# Patient Record
Sex: MALE | Race: OTHER | HISPANIC OR LATINO
[De-identification: names, ages, dates, MRNs, and addresses within clinical notes are randomized per-mention and may not be internally consistent; named-entity substitution may affect disease eponyms.]

---

## 2023-02-01 PROBLEM — Z00.00 ENCOUNTER FOR PREVENTIVE HEALTH EXAMINATION: Status: ACTIVE | Noted: 2023-02-01

## 2023-02-06 ENCOUNTER — APPOINTMENT (OUTPATIENT)
Dept: PULMONOLOGY | Facility: CLINIC | Age: 43
End: 2023-02-06

## 2023-02-27 ENCOUNTER — NON-APPOINTMENT (OUTPATIENT)
Age: 43
End: 2023-02-27

## 2023-03-02 ENCOUNTER — NON-APPOINTMENT (OUTPATIENT)
Age: 43
End: 2023-03-02

## 2023-03-03 ENCOUNTER — APPOINTMENT (OUTPATIENT)
Dept: PULMONOLOGY | Facility: CLINIC | Age: 43
End: 2023-03-03
Payer: COMMERCIAL

## 2023-03-03 VITALS
BODY MASS INDEX: 35.2 KG/M2 | WEIGHT: 219 LBS | HEIGHT: 66 IN | RESPIRATION RATE: 15 BRPM | DIASTOLIC BLOOD PRESSURE: 75 MMHG | SYSTOLIC BLOOD PRESSURE: 113 MMHG | HEART RATE: 65 BPM | OXYGEN SATURATION: 96 % | TEMPERATURE: 98.3 F

## 2023-03-03 DIAGNOSIS — Z82.49 FAMILY HISTORY OF ISCHEMIC HEART DISEASE AND OTHER DISEASES OF THE CIRCULATORY SYSTEM: ICD-10-CM

## 2023-03-03 DIAGNOSIS — Z80.9 FAMILY HISTORY OF MALIGNANT NEOPLASM, UNSPECIFIED: ICD-10-CM

## 2023-03-03 DIAGNOSIS — R05.9 COUGH, UNSPECIFIED: ICD-10-CM

## 2023-03-03 LAB — POCT - HEMOGLOBIN (HGB), QUANTITATIVE, TRANSCUTANEOUS: 14.4

## 2023-03-03 PROCEDURE — 94726 PLETHYSMOGRAPHY LUNG VOLUMES: CPT

## 2023-03-03 PROCEDURE — 71046 X-RAY EXAM CHEST 2 VIEWS: CPT

## 2023-03-03 PROCEDURE — 94010 BREATHING CAPACITY TEST: CPT

## 2023-03-03 PROCEDURE — 95012 NITRIC OXIDE EXP GAS DETER: CPT

## 2023-03-03 PROCEDURE — 94729 DIFFUSING CAPACITY: CPT

## 2023-03-03 PROCEDURE — 99203 OFFICE O/P NEW LOW 30 MIN: CPT | Mod: 25

## 2023-03-03 PROCEDURE — 88738 HGB QUANT TRANSCUTANEOUS: CPT

## 2023-03-03 PROCEDURE — ZZZZZ: CPT

## 2023-03-03 RX ORDER — ALBUTEROL SULFATE 90 UG/1
108 (90 BASE) INHALANT RESPIRATORY (INHALATION)
Refills: 0 | Status: ACTIVE | COMMUNITY

## 2023-03-03 RX ORDER — RABEPRAZOLE SODIUM 20 MG/1
20 TABLET, DELAYED RELEASE ORAL
Refills: 0 | Status: ACTIVE | COMMUNITY

## 2023-03-03 RX ORDER — SERTRALINE 25 MG/1
TABLET, FILM COATED ORAL
Refills: 0 | Status: ACTIVE | COMMUNITY

## 2023-03-03 NOTE — PHYSICAL EXAM
[No Acute Distress] : no acute distress [Normal Appearance] : normal appearance [No Neck Mass] : no neck mass [Normal Rate/Rhythm] : normal rate/rhythm [Normal S1, S2] : normal s1, s2 [No Murmurs] : no murmurs [No Resp Distress] : no resp distress [Clear to Auscultation Bilaterally] : clear to auscultation bilaterally [No Abnormalities] : no abnormalities [Benign] : benign [Normal Gait] : normal gait [No Clubbing] : no clubbing [No Cyanosis] : no cyanosis [No Edema] : no edema [FROM] : FROM [Normal Color/ Pigmentation] : normal color/ pigmentation [No Focal Deficits] : no focal deficits [Oriented x3] : oriented x3 [Normal Affect] : normal affect [III] : Mallampati Class: III [3+] : Right Tonsil: 3+

## 2023-03-04 PROBLEM — R05.9 COUGH: Status: ACTIVE | Noted: 2023-03-03

## 2023-03-04 NOTE — ASSESSMENT
[FreeTextEntry1] : Cough secondary to asthma.  Appears to be getting better slowly in that regard and does not appear to need treatment other than albuterol.  Should have sleep evaluation home sleep study.  We talked about how many patients have appeared to develop worsening sleep issues post-COVID with this is related to a COVID issue or weight gain is still unclear.\par \par Based on history and physical the patient has a high likelihood of having obstructive sleep apnea. Further assessment by sleep testing is recommended. There is no contraindication to a home sleep study. We will therefore proceed to two night home apnea sleep study for further assessment.\par

## 2023-03-04 NOTE — HISTORY OF PRESENT ILLNESS
[Never] : never [Obstructive Sleep Apnea] : obstructive sleep apnea [Awakes Unrefreshed] : awakes unrefreshed [Awakes with Dry Mouth] : awakes with dry mouth [Snoring] : snoring [TextBox_4] : Patient here for evaluation of asthma and COVID.  Remote history of mild asthma before COVID.  In the summer 2022 he had COVID he was managed at home and took no specific medications.  Since that time he has developed shortness of breath and is using an asthma inhaler more frequently.  He seems to be getting better slowly over time he uses his albuterol for exercise and once in a while as needed he has not needed to take other medications he is not losing weight and his appetite is good\par He is also requesting evaluation for sleep apnea.\par \par \par \par \par  [Awakes with Headache] : does not awaken with headache [Unusual Sleep Behavior] : no unusual sleep behavior [Witnessed Apneas] : no witnessed apneas

## 2023-03-14 ENCOUNTER — FORM ENCOUNTER (OUTPATIENT)
Age: 43
End: 2023-03-14

## 2023-03-15 ENCOUNTER — APPOINTMENT (OUTPATIENT)
Dept: PULMONOLOGY | Facility: CLINIC | Age: 43
End: 2023-03-15
Payer: COMMERCIAL

## 2023-03-15 PROCEDURE — 95800 SLP STDY UNATTENDED: CPT

## 2023-03-16 PROCEDURE — 95800 SLP STDY UNATTENDED: CPT

## 2023-03-31 ENCOUNTER — APPOINTMENT (OUTPATIENT)
Dept: PULMONOLOGY | Facility: CLINIC | Age: 43
End: 2023-03-31
Payer: COMMERCIAL

## 2023-03-31 PROCEDURE — 99212 OFFICE O/P EST SF 10 MIN: CPT | Mod: 95

## 2023-03-31 NOTE — REASON FOR VISIT
[Home] : at home, [unfilled] , at the time of the visit. [Medical Office: ([facility])___] : at the medical office located in  [Patient] : the patient

## 2023-03-31 NOTE — HISTORY OF PRESENT ILLNESS
[TextBox_4] : Post COVID, daytime sleepiness.  Cough.  Overall improving but still reports daytime sleepiness.  Underwent home sleep study visit for review

## 2023-03-31 NOTE — ASSESSMENT
[FreeTextEntry1] : Patient still concerned about sleep apnea issues would like to pursue further recommend laboratory polysomnography in light of negative home sleep study ongoing symptomatology of nonrestorative sleep.

## 2023-06-16 ENCOUNTER — APPOINTMENT (OUTPATIENT)
Dept: PULMONOLOGY | Facility: CLINIC | Age: 43
End: 2023-06-16
Payer: COMMERCIAL

## 2023-06-16 VITALS
TEMPERATURE: 97.9 F | OXYGEN SATURATION: 98 % | HEART RATE: 75 BPM | DIASTOLIC BLOOD PRESSURE: 76 MMHG | WEIGHT: 214 LBS | SYSTOLIC BLOOD PRESSURE: 121 MMHG | HEIGHT: 66 IN | BODY MASS INDEX: 34.39 KG/M2

## 2023-06-16 DIAGNOSIS — G47.33 OBSTRUCTIVE SLEEP APNEA (ADULT) (PEDIATRIC): ICD-10-CM

## 2023-06-16 PROCEDURE — 99213 OFFICE O/P EST LOW 20 MIN: CPT

## 2023-06-16 NOTE — ASSESSMENT
[FreeTextEntry1] : Discussed significance of upper airways resistance in the context of sleep apnea.  I explained to the patient that this is a mild form of sleep apnea that can cause symptoms but likely will not affect his long-term prognosis or health.  This could be addressed in multiple ways.  He could make an effort to lose weight.  He could see a dentist for an oral appliance.  He could try CPAP.\par \par Tablets in the diagnosis of upper airways resistance syndrome requires either a sleep study with an esophageal balloon or a clinical response to CPAP.  For this reason we will do a CPAP trial using a loaner device.\par \par Respiratory therapist to contact patient when motor is available to set up on CPAP 5 with what ever mask patient tolerates follow-up 1 month later

## 2023-06-16 NOTE — PROCEDURE
[FreeTextEntry1] : Laboratory polysomnography demonstrates upper airway resistance syndrome with elevated frequency of respiratory effort related arousals

## 2023-06-16 NOTE — HISTORY OF PRESENT ILLNESS
[TextBox_4] : Follow-up for sleep apnea.  History suggestive of CORINNA negative home sleep study went for laboratory test here for evaluation and review.  Continues to complain of nonrestorative sleep ongoing issues with shift work as well

## 2023-06-30 ENCOUNTER — APPOINTMENT (OUTPATIENT)
Dept: PULMONOLOGY | Facility: CLINIC | Age: 43
End: 2023-06-30
Payer: COMMERCIAL

## 2023-06-30 VITALS
OXYGEN SATURATION: 96 % | HEART RATE: 61 BPM | HEIGHT: 66 IN | RESPIRATION RATE: 15 BRPM | BODY MASS INDEX: 34.39 KG/M2 | TEMPERATURE: 98.1 F | WEIGHT: 214 LBS | DIASTOLIC BLOOD PRESSURE: 72 MMHG | SYSTOLIC BLOOD PRESSURE: 115 MMHG

## 2023-06-30 PROCEDURE — 94660 CPAP INITIATION&MGMT: CPT

## 2023-06-30 NOTE — PROCEDURE
[FreeTextEntry1] : Patient given a loaner [employer]Med Airsense 10 AutoSet s/n [id number] D/N 461 with climate line tubing.  Pressure set at 5cmH2O.  Patient fit for a F&P Eson 2 mask size large.  Patient given a full demonstration on how to operate and maintain equipment and supplies.  Patient was able to demonstrate self operation of equipment and supplies.

## 2023-08-04 ENCOUNTER — APPOINTMENT (OUTPATIENT)
Dept: PULMONOLOGY | Facility: CLINIC | Age: 43
End: 2023-08-04
Payer: COMMERCIAL

## 2023-08-04 VITALS
TEMPERATURE: 98.3 F | HEIGHT: 66 IN | OXYGEN SATURATION: 99 % | SYSTOLIC BLOOD PRESSURE: 127 MMHG | HEART RATE: 76 BPM | WEIGHT: 210 LBS | DIASTOLIC BLOOD PRESSURE: 83 MMHG | BODY MASS INDEX: 33.75 KG/M2

## 2023-08-04 DIAGNOSIS — U09.9 POST COVID-19 CONDITION, UNSPECIFIED: ICD-10-CM

## 2023-08-04 PROCEDURE — 99212 OFFICE O/P EST SF 10 MIN: CPT

## 2023-08-04 NOTE — HISTORY OF PRESENT ILLNESS
[TextBox_4] : Follow-up for post COVID related fatigue.  Given a trial of CPAP.  Actually feeling better but is not using the CPAP at all.  He returned the equipment.  Reports no new complaints